# Patient Record
Sex: MALE | NOT HISPANIC OR LATINO | URBAN - METROPOLITAN AREA
[De-identification: names, ages, dates, MRNs, and addresses within clinical notes are randomized per-mention and may not be internally consistent; named-entity substitution may affect disease eponyms.]

---

## 2019-03-11 ENCOUNTER — IMPORTED ENCOUNTER (OUTPATIENT)
Dept: URBAN - METROPOLITAN AREA CLINIC 1 | Facility: CLINIC | Age: 49
End: 2019-03-11

## 2019-03-11 PROBLEM — H52.4: Noted: 2019-03-11

## 2019-03-11 PROBLEM — H52.11: Noted: 2019-03-11

## 2019-03-11 PROBLEM — H52.223: Noted: 2019-03-11

## 2019-03-11 PROCEDURE — S0620 ROUTINE OPHTHALMOLOGICAL EXA: HCPCS

## 2019-03-11 NOTE — PATIENT DISCUSSION
1. Myopia OD -- Finalized Glasses MRx was given to patient today for correction if indicated and requested2. Astigmatism OU3. Presbyopia OU Return for an appointment in 1 YR for a [address] OU [address] Dr. Patsy Rodrigez.

## 2020-06-22 ENCOUNTER — IMPORTED ENCOUNTER (OUTPATIENT)
Dept: URBAN - METROPOLITAN AREA CLINIC 1 | Facility: CLINIC | Age: 50
End: 2020-06-22

## 2020-06-22 PROBLEM — H52.13: Noted: 2020-06-22

## 2020-06-22 PROBLEM — H52.4: Noted: 2020-06-22

## 2020-06-22 PROCEDURE — S0621 ROUTINE OPHTHALMOLOGICAL EXA: HCPCS

## 2021-06-22 ENCOUNTER — IMPORTED ENCOUNTER (OUTPATIENT)
Dept: URBAN - METROPOLITAN AREA CLINIC 1 | Facility: CLINIC | Age: 51
End: 2021-06-22

## 2021-06-22 PROBLEM — H52.4: Noted: 2021-06-22

## 2021-06-22 PROBLEM — H52.13: Noted: 2021-06-22

## 2021-06-22 PROBLEM — H52.223: Noted: 2021-06-22

## 2021-06-22 PROCEDURE — S0621 ROUTINE OPHTHALMOLOGICAL EXA: HCPCS

## 2021-06-22 NOTE — PATIENT DISCUSSION
1. Myopia w/ Astigmatism OU -- Rx was given for correction if indicated and requested. 2. Presbyopia 3. Dermatochlasis OU UL's -- Non-surgical at this time continue to monitor for progression. Return for an appointment in 1 year 36 with Dr. Joni Schaumann.

## 2022-04-02 ASSESSMENT — TONOMETRY
OD_IOP_MMHG: 17
OS_IOP_MMHG: 18
OS_IOP_MMHG: 17
OD_IOP_MMHG: 18
OD_IOP_MMHG: 16
OS_IOP_MMHG: 16

## 2022-04-02 ASSESSMENT — VISUAL ACUITY
OS_SC: J2
OS_CC: 20/20
OD_CC: J1+
OS_SC: 20/20
OD_SC: 20/20
OS_CC: J1+
OS_SC: 20/20-1
OD_CC: 20/20
OD_SC: J2
OD_SC: 20/20

## 2022-06-23 ENCOUNTER — COMPREHENSIVE EXAM (OUTPATIENT)
Dept: URBAN - METROPOLITAN AREA CLINIC 1 | Facility: CLINIC | Age: 52
End: 2022-06-23

## 2022-06-23 DIAGNOSIS — H52.4: ICD-10-CM

## 2022-06-23 DIAGNOSIS — H52.223: ICD-10-CM

## 2022-06-23 DIAGNOSIS — H52.13: ICD-10-CM

## 2022-06-23 PROCEDURE — 92014 COMPRE OPH EXAM EST PT 1/>: CPT

## 2022-06-23 ASSESSMENT — TONOMETRY
OD_IOP_MMHG: 16
OS_IOP_MMHG: 16

## 2022-06-23 ASSESSMENT — VISUAL ACUITY
OS_SC: 20/20
OD_SC: 20/20-1

## 2023-06-26 ENCOUNTER — COMPREHENSIVE EXAM (OUTPATIENT)
Dept: URBAN - METROPOLITAN AREA CLINIC 1 | Facility: CLINIC | Age: 53
End: 2023-06-26

## 2023-06-26 DIAGNOSIS — H52.223: ICD-10-CM

## 2023-06-26 DIAGNOSIS — Z01.00: ICD-10-CM

## 2023-06-26 DIAGNOSIS — H52.4: ICD-10-CM

## 2023-06-26 DIAGNOSIS — H52.13: ICD-10-CM

## 2023-06-26 PROCEDURE — 92014 COMPRE OPH EXAM EST PT 1/>: CPT

## 2023-06-26 PROCEDURE — 92015 DETERMINE REFRACTIVE STATE: CPT

## 2023-06-26 ASSESSMENT — TONOMETRY
OD_IOP_MMHG: 14
OS_IOP_MMHG: 14

## 2023-06-26 ASSESSMENT — VISUAL ACUITY
OS_SC: 20/20-1
OS_CC: J1+
OD_SC: 20/20
OD_CC: J1+
OS_CC: 20/20
OD_CC: 20/20

## 2024-06-27 ENCOUNTER — COMPREHENSIVE EXAM (OUTPATIENT)
Dept: URBAN - METROPOLITAN AREA CLINIC 1 | Facility: CLINIC | Age: 54
End: 2024-06-27

## 2024-06-27 DIAGNOSIS — Z01.00: ICD-10-CM

## 2024-06-27 DIAGNOSIS — H52.4: ICD-10-CM

## 2024-06-27 DIAGNOSIS — H52.13: ICD-10-CM

## 2024-06-27 DIAGNOSIS — H52.223: ICD-10-CM

## 2024-06-27 PROCEDURE — 92014 COMPRE OPH EXAM EST PT 1/>: CPT

## 2024-06-27 PROCEDURE — 92015 DETERMINE REFRACTIVE STATE: CPT

## 2024-06-27 ASSESSMENT — VISUAL ACUITY
OD_CC: J1+
OS_CC: 20/20
OS_CC: J1+
OD_CC: 20/20

## 2024-06-27 ASSESSMENT — TONOMETRY
OS_IOP_MMHG: 14
OD_IOP_MMHG: 14

## 2025-07-03 ENCOUNTER — COMPREHENSIVE EXAM (OUTPATIENT)
Age: 55
End: 2025-07-03

## 2025-07-03 DIAGNOSIS — H25.13: ICD-10-CM

## 2025-07-03 DIAGNOSIS — H02.831: ICD-10-CM

## 2025-07-03 DIAGNOSIS — H02.834: ICD-10-CM

## 2025-07-03 PROCEDURE — 92014 COMPRE OPH EXAM EST PT 1/>: CPT

## 2025-07-03 PROCEDURE — 92015 DETERMINE REFRACTIVE STATE: CPT | Mod: NC
